# Patient Record
Sex: FEMALE | Race: WHITE | NOT HISPANIC OR LATINO | Employment: OTHER | URBAN - METROPOLITAN AREA
[De-identification: names, ages, dates, MRNs, and addresses within clinical notes are randomized per-mention and may not be internally consistent; named-entity substitution may affect disease eponyms.]

---

## 2023-09-19 PROBLEM — H61.22 CERUMEN DEBRIS ON TYMPANIC MEMBRANE OF LEFT EAR: Status: ACTIVE | Noted: 2023-09-19

## 2023-09-19 PROBLEM — M65.4 DE QUERVAIN'S SYNDROME (TENOSYNOVITIS): Status: ACTIVE | Noted: 2023-09-19

## 2023-09-19 PROBLEM — R00.2 PALPITATION: Status: ACTIVE | Noted: 2023-09-19

## 2023-09-19 PROBLEM — E78.5 HYPERLIPIDEMIA: Status: ACTIVE | Noted: 2023-09-19

## 2023-09-19 PROBLEM — M54.50 LUMBOSACRAL PAIN: Status: ACTIVE | Noted: 2023-09-19

## 2023-09-19 PROBLEM — R42 VERTIGO: Status: ACTIVE | Noted: 2023-09-19

## 2023-09-19 PROBLEM — I49.3 PVC (PREMATURE VENTRICULAR CONTRACTION): Status: ACTIVE | Noted: 2023-09-19

## 2023-09-19 PROBLEM — M85.80 OSTEOPENIA: Status: ACTIVE | Noted: 2023-09-19

## 2023-09-19 PROBLEM — I47.10 SVT (SUPRAVENTRICULAR TACHYCARDIA) (CMS-HCC): Status: ACTIVE | Noted: 2023-09-19

## 2023-09-19 RX ORDER — CHLORHEXIDINE GLUCONATE ORAL RINSE 1.2 MG/ML
SOLUTION DENTAL AS NEEDED
COMMUNITY
Start: 2020-10-03

## 2023-09-19 RX ORDER — VITS A,C,E/LUTEIN/MINERALS 300MCG-200
1 TABLET ORAL DAILY
COMMUNITY

## 2023-09-19 RX ORDER — ZINC GLUCONATE 50 MG
1 TABLET ORAL DAILY
COMMUNITY

## 2023-09-19 RX ORDER — DEXTROMETHORPHAN HYDROBROMIDE, GUAIFENESIN 5; 100 MG/5ML; MG/5ML
1 LIQUID ORAL EVERY 8 HOURS PRN
COMMUNITY

## 2023-09-19 RX ORDER — MULTIVIT-MIN/FA/LYCOPEN/LUTEIN .4-300-25
1 TABLET ORAL DAILY
COMMUNITY

## 2023-09-19 RX ORDER — ALUMINUM ZIRCONIUM OCTACHLOROHYDREX GLY 16 G/100G
1 GEL TOPICAL DAILY
COMMUNITY
End: 2024-04-15 | Stop reason: WASHOUT

## 2023-10-19 ENCOUNTER — APPOINTMENT (OUTPATIENT)
Dept: PRIMARY CARE | Facility: CLINIC | Age: 83
End: 2023-10-19
Payer: MEDICARE

## 2024-04-14 NOTE — PROGRESS NOTES
"Chief Complaint   Patient presents with    Medicare Annual Wellness Visit Subsequent     Pt here for AWV       83 y.o. for AWV  Last visit 11/2021. Was in [place] for a long time because of her daughter's illness  Daughter with ulcers, severe narrowing of esophagus, and had to have part of stomach removed.  Pt now back in [place] and planning to stay here for next few months.     Spinal stenosis-had numbness feet up leg.   Taking NeuroPure for nerve health. and Durable heart (contains bergamonte)  for cholesterol. She wanted to avoid statins.     Past medical history  Palpitations on chronic atenolol. Dr Cardoza  admission 2019. Dr Medina.   Glaucoma status post surgery  Cataract status post surgery Dr. Liban Harrington  Left fibular fracture, remote  Osteopenia  Hyperlipidemia previously declined statin  Sciatica, slipped disc. Physical therapy. Dr Puente     Social History   2 children. son and daughter alive and well  nonsmoker  retired. special needs education.    Blood pressure 118/68, pulse 68, temperature 36.6 °C (97.9 °F), height 1.613 m (5' 3.5\"), weight 48.5 kg (107 lb), SpO2 98%.  Body mass index is 18.66 kg/m².    Physical Examination  General: NAD. Alert.   HEENT: Normocephalic atraumatic.    Eyes: no scleral icterus. Extraocular movements intact.  Pupils equal round and reactive to light.  Ears: TM intact.  No cerumen. Hearing grossly intact.   Throat: No exudate  Neck:  Supple. No thyroid goiter.  Lymph nodes: No cervical or clavicular adenopathy  Cardiovascular: Regular rate rhythm S1-S2 normal no murmur. No carotid bruit.   Lungs: Clear to Auscultation without wheezing, rales, or rhonchi, Breath sounds symmetric. No use of accessory muscles  Abdomen:  +abdominal bruit. Normoactive bowel sounds, soft, non-tender. No mass.  No organomegaly  Extremities: No pretibial edema  Neuro: no facial asymmetry. Strength upper and lower extremities 5/5. Sensation intact to light touch. No tremor. " Babinski negative  Skin: no rash noted  Vascular: DP pulses intact.   Breast: Both are symmetrical no nipple discharge.  No skin changes.  No mass palpated.  No axillary adenopathy.    No recent labs    ASSESSMENT/PLAN  AWV  Living Will: has a living will.   Cognition/Memory if 65 or above: 3/3 recall   Hepatitis C (18-79) n/a  HIV (18-64, once) n/a  Women: mammogram: declines  Dexa: ordered  Colon cancer screening 45 and older: did not recommend  Immunization:   Depression:  (if screen score 0 do not bill)   Time Spent doing/reviewing depression screening:     If Smoker/Former smoker: screen US aorta (man 65-75) n/a  Age 50-75, 20 pack year history, quit within 15 years or currently smoking  (medicare 55-77, 30 pack year) n/a    1. Asymptomatic menopausal state  - XR DEXA bone density; Future    2. Routine general medical examination at health care facility    3. Spinal stenosis, unspecified spinal region      4. Numbness and tingling  - Comprehensive metabolic panel; Future  - CBC; Future  - Vitamin B12; Future    5. Screening, lipid  - Lipid Panel; Future    6. Vitamin D deficiency  - Vitamin D 25-Hydroxy,Total (for eval of Vitamin D levels); Future    7. Abdominal bruit  No GI symptoms  - Vascular US renal artery duplex complete; Future  - Vascular US Abdominal Aorta Aneurysm AAA Screening; Future    8. SVT (supraventricular tachycardia) (CMS-HCC)  No cardiopulmonary symptoms at this time.        Current Outpatient Medications on File Prior to Visit   Medication Sig Dispense Refill    acetaminophen (Tylenol 8 HOUR) 650 mg ER tablet Take 1 tablet (650 mg) by mouth every 8 hours if needed. 3-4 times daily      calcium-vitamin D3-vitamin K 500-100-40 mg-unit-mcg tablet,chewable Chew 1 tablet once daily.      chlorhexidine (Peridex) 0.12 % solution Use in the mouth or throat if needed.      diclofenac sodium 1 % kit 2 times a day as needed.  APPLY SPARINGLY TO AFFECTED AREA(S) TWICE DAILY as needed      magnesium  oxide 500 mg capsule Take 1 capsule (500 mg) by mouth once daily.      multivitamin with minerals iron-free (Centrum Silver) Take 1 tablet by mouth once daily.      vit A,C and E-lutein-minerals (Ocuvite with Lutein) 300 mcg-200 mg-27 mg-2 mg tablet Take 1 tablet by mouth once daily.      Bifidobacterium infantis (Align) 4 mg capsule Take 1 capsule (4 mg) by mouth once daily.       No current facility-administered medications on file prior to visit.

## 2024-04-15 ENCOUNTER — OFFICE VISIT (OUTPATIENT)
Dept: PRIMARY CARE | Facility: CLINIC | Age: 84
End: 2024-04-15
Payer: MEDICARE

## 2024-04-15 VITALS
SYSTOLIC BLOOD PRESSURE: 118 MMHG | DIASTOLIC BLOOD PRESSURE: 68 MMHG | OXYGEN SATURATION: 98 % | BODY MASS INDEX: 18.27 KG/M2 | HEIGHT: 64 IN | TEMPERATURE: 97.9 F | HEART RATE: 68 BPM | WEIGHT: 107 LBS

## 2024-04-15 DIAGNOSIS — Z78.0 ASYMPTOMATIC MENOPAUSAL STATE: ICD-10-CM

## 2024-04-15 DIAGNOSIS — M48.00 SPINAL STENOSIS, UNSPECIFIED SPINAL REGION: ICD-10-CM

## 2024-04-15 DIAGNOSIS — Z13.220 SCREENING, LIPID: ICD-10-CM

## 2024-04-15 DIAGNOSIS — R09.89 ABDOMINAL BRUIT: ICD-10-CM

## 2024-04-15 DIAGNOSIS — R20.0 NUMBNESS AND TINGLING: ICD-10-CM

## 2024-04-15 DIAGNOSIS — I47.10 SVT (SUPRAVENTRICULAR TACHYCARDIA) (CMS-HCC): ICD-10-CM

## 2024-04-15 DIAGNOSIS — R20.2 NUMBNESS AND TINGLING: ICD-10-CM

## 2024-04-15 DIAGNOSIS — Z00.00 ROUTINE GENERAL MEDICAL EXAMINATION AT HEALTH CARE FACILITY: Primary | ICD-10-CM

## 2024-04-15 DIAGNOSIS — E55.9 VITAMIN D DEFICIENCY: ICD-10-CM

## 2024-04-15 PROBLEM — R42 VERTIGO: Status: RESOLVED | Noted: 2023-09-19 | Resolved: 2024-04-15

## 2024-04-15 PROBLEM — H61.22 CERUMEN DEBRIS ON TYMPANIC MEMBRANE OF LEFT EAR: Status: RESOLVED | Noted: 2023-09-19 | Resolved: 2024-04-15

## 2024-04-15 PROBLEM — H40.9 GLAUCOMA: Status: ACTIVE | Noted: 2024-04-15

## 2024-04-15 PROBLEM — M54.30 SCIATICA: Status: ACTIVE | Noted: 2024-04-15

## 2024-04-15 PROBLEM — H26.9 CATARACT: Status: ACTIVE | Noted: 2024-04-15

## 2024-04-15 PROCEDURE — 1126F AMNT PAIN NOTED NONE PRSNT: CPT | Performed by: INTERNAL MEDICINE

## 2024-04-15 PROCEDURE — 1170F FXNL STATUS ASSESSED: CPT | Performed by: INTERNAL MEDICINE

## 2024-04-15 PROCEDURE — 1036F TOBACCO NON-USER: CPT | Performed by: INTERNAL MEDICINE

## 2024-04-15 PROCEDURE — G0439 PPPS, SUBSEQ VISIT: HCPCS | Performed by: INTERNAL MEDICINE

## 2024-04-15 PROCEDURE — 99397 PER PM REEVAL EST PAT 65+ YR: CPT | Performed by: INTERNAL MEDICINE

## 2024-04-15 PROCEDURE — 1159F MED LIST DOCD IN RCRD: CPT | Performed by: INTERNAL MEDICINE

## 2024-04-15 ASSESSMENT — ACTIVITIES OF DAILY LIVING (ADL)
DOING_HOUSEWORK: INDEPENDENT
GROCERY_SHOPPING: INDEPENDENT
DRESSING: INDEPENDENT
MANAGING_FINANCES: INDEPENDENT
BATHING: INDEPENDENT
TAKING_MEDICATION: INDEPENDENT

## 2024-04-15 ASSESSMENT — LIFESTYLE VARIABLES
SKIP TO QUESTIONS 9-10: 1
HOW OFTEN DO YOU HAVE SIX OR MORE DRINKS ON ONE OCCASION: NEVER
HOW MANY STANDARD DRINKS CONTAINING ALCOHOL DO YOU HAVE ON A TYPICAL DAY: PATIENT DOES NOT DRINK
HOW OFTEN DO YOU HAVE A DRINK CONTAINING ALCOHOL: NEVER
AUDIT-C TOTAL SCORE: 0

## 2024-04-15 ASSESSMENT — PATIENT HEALTH QUESTIONNAIRE - PHQ9
1. LITTLE INTEREST OR PLEASURE IN DOING THINGS: NOT AT ALL
2. FEELING DOWN, DEPRESSED OR HOPELESS: NOT AT ALL
SUM OF ALL RESPONSES TO PHQ9 QUESTIONS 1 & 2: 0

## 2024-04-15 ASSESSMENT — PAIN SCALES - GENERAL: PAINLEVEL: 0-NO PAIN

## 2024-04-22 ENCOUNTER — LAB (OUTPATIENT)
Dept: LAB | Facility: LAB | Age: 84
End: 2024-04-22
Payer: MEDICARE

## 2024-04-22 DIAGNOSIS — E55.9 VITAMIN D DEFICIENCY: ICD-10-CM

## 2024-04-22 DIAGNOSIS — R20.2 NUMBNESS AND TINGLING: ICD-10-CM

## 2024-04-22 DIAGNOSIS — Z13.220 SCREENING, LIPID: ICD-10-CM

## 2024-04-22 DIAGNOSIS — R20.0 NUMBNESS AND TINGLING: ICD-10-CM

## 2024-04-22 LAB
25(OH)D3 SERPL-MCNC: 81 NG/ML (ref 30–100)
ALBUMIN SERPL BCP-MCNC: 4.2 G/DL (ref 3.4–5)
ALP SERPL-CCNC: 58 U/L (ref 33–136)
ALT SERPL W P-5'-P-CCNC: 20 U/L (ref 7–45)
ANION GAP SERPL CALC-SCNC: 12 MMOL/L (ref 10–20)
AST SERPL W P-5'-P-CCNC: 25 U/L (ref 9–39)
BILIRUB SERPL-MCNC: 0.5 MG/DL (ref 0–1.2)
BUN SERPL-MCNC: 14 MG/DL (ref 6–23)
CALCIUM SERPL-MCNC: 9.3 MG/DL (ref 8.6–10.6)
CHLORIDE SERPL-SCNC: 105 MMOL/L (ref 98–107)
CHOLEST SERPL-MCNC: 218 MG/DL (ref 0–199)
CHOLESTEROL/HDL RATIO: 2.4
CO2 SERPL-SCNC: 30 MMOL/L (ref 21–32)
CREAT SERPL-MCNC: 0.88 MG/DL (ref 0.5–1.05)
EGFRCR SERPLBLD CKD-EPI 2021: 65 ML/MIN/1.73M*2
ERYTHROCYTE [DISTWIDTH] IN BLOOD BY AUTOMATED COUNT: 12.9 % (ref 11.5–14.5)
GLUCOSE SERPL-MCNC: 79 MG/DL (ref 74–99)
HCT VFR BLD AUTO: 40.7 % (ref 36–46)
HDLC SERPL-MCNC: 90 MG/DL
HGB BLD-MCNC: 12.9 G/DL (ref 12–16)
LDLC SERPL CALC-MCNC: 110 MG/DL
MCH RBC QN AUTO: 31.1 PG (ref 26–34)
MCHC RBC AUTO-ENTMCNC: 31.7 G/DL (ref 32–36)
MCV RBC AUTO: 98 FL (ref 80–100)
NON HDL CHOLESTEROL: 128 MG/DL (ref 0–149)
NRBC BLD-RTO: 0 /100 WBCS (ref 0–0)
PLATELET # BLD AUTO: 222 X10*3/UL (ref 150–450)
POTASSIUM SERPL-SCNC: 3.9 MMOL/L (ref 3.5–5.3)
PROT SERPL-MCNC: 6.5 G/DL (ref 6.4–8.2)
RBC # BLD AUTO: 4.15 X10*6/UL (ref 4–5.2)
SODIUM SERPL-SCNC: 143 MMOL/L (ref 136–145)
TRIGL SERPL-MCNC: 88 MG/DL (ref 0–149)
VIT B12 SERPL-MCNC: 846 PG/ML (ref 211–911)
VLDL: 18 MG/DL (ref 0–40)
WBC # BLD AUTO: 6.4 X10*3/UL (ref 4.4–11.3)

## 2024-04-22 PROCEDURE — 82306 VITAMIN D 25 HYDROXY: CPT

## 2024-04-22 PROCEDURE — 36415 COLL VENOUS BLD VENIPUNCTURE: CPT

## 2024-04-22 PROCEDURE — 85027 COMPLETE CBC AUTOMATED: CPT

## 2024-04-22 PROCEDURE — 80061 LIPID PANEL: CPT

## 2024-04-22 PROCEDURE — 80053 COMPREHEN METABOLIC PANEL: CPT

## 2024-04-22 PROCEDURE — 82607 VITAMIN B-12: CPT

## 2024-04-29 ENCOUNTER — TELEPHONE (OUTPATIENT)
Dept: PRIMARY CARE | Facility: CLINIC | Age: 84
End: 2024-04-29
Payer: MEDICARE

## 2024-04-29 NOTE — TELEPHONE ENCOUNTER
----- Message from Ana Decker MD sent at 4/26/2024  4:34 PM EDT -----  Bone density test shows osteoporosis.  Please have patient schedule appointment to go over results and discuss treatment options.  Visit can be a virtual visit

## 2024-05-07 PROBLEM — E55.9 VITAMIN D DEFICIENCY: Status: ACTIVE | Noted: 2024-04-22

## 2024-05-07 PROBLEM — M65.4 RADIAL STYLOID TENOSYNOVITIS: Status: ACTIVE | Noted: 2023-09-19

## 2024-05-07 PROBLEM — H61.20 IMPACTED CERUMEN: Status: ACTIVE | Noted: 2024-05-07

## 2024-05-07 PROBLEM — T83.511A URINARY TRACT INFECTION ASSOCIATED WITH CATHETERIZATION OF URINARY TRACT (CMS-HCC): Status: ACTIVE | Noted: 2024-05-07

## 2024-05-07 PROBLEM — N34.2 INFECTIVE URETHRITIS: Status: ACTIVE | Noted: 2024-05-07

## 2024-05-07 PROBLEM — M54.16 LUMBAR RADICULITIS: Status: ACTIVE | Noted: 2023-09-19

## 2024-05-07 PROBLEM — I10 HYPERTENSION: Status: ACTIVE | Noted: 2024-05-07

## 2024-05-07 PROBLEM — R09.89 ABDOMINAL BRUIT: Status: ACTIVE | Noted: 2024-05-07

## 2024-05-07 PROBLEM — N39.0 URINARY TRACT INFECTION ASSOCIATED WITH CATHETERIZATION OF URINARY TRACT (CMS-HCC): Status: ACTIVE | Noted: 2024-05-07

## 2024-05-08 ENCOUNTER — OFFICE VISIT (OUTPATIENT)
Dept: PRIMARY CARE | Facility: CLINIC | Age: 84
End: 2024-05-08
Payer: MEDICARE

## 2024-05-08 VITALS
HEART RATE: 68 BPM | TEMPERATURE: 98.2 F | OXYGEN SATURATION: 93 % | WEIGHT: 107.8 LBS | BODY MASS INDEX: 18.4 KG/M2 | HEIGHT: 64 IN | DIASTOLIC BLOOD PRESSURE: 60 MMHG | SYSTOLIC BLOOD PRESSURE: 110 MMHG

## 2024-05-08 DIAGNOSIS — R19.7 DIARRHEA, UNSPECIFIED TYPE: ICD-10-CM

## 2024-05-08 DIAGNOSIS — M81.0 OSTEOPOROSIS, UNSPECIFIED OSTEOPOROSIS TYPE, UNSPECIFIED PATHOLOGICAL FRACTURE PRESENCE: Primary | ICD-10-CM

## 2024-05-08 PROBLEM — N39.0 URINARY TRACT INFECTION ASSOCIATED WITH CATHETERIZATION OF URINARY TRACT (CMS-HCC): Status: RESOLVED | Noted: 2024-05-07 | Resolved: 2024-05-08

## 2024-05-08 PROBLEM — N34.2 INFECTIVE URETHRITIS: Status: RESOLVED | Noted: 2024-05-07 | Resolved: 2024-05-08

## 2024-05-08 PROBLEM — T83.511A URINARY TRACT INFECTION ASSOCIATED WITH CATHETERIZATION OF URINARY TRACT (CMS-HCC): Status: RESOLVED | Noted: 2024-05-07 | Resolved: 2024-05-08

## 2024-05-08 PROCEDURE — 1160F RVW MEDS BY RX/DR IN RCRD: CPT | Performed by: INTERNAL MEDICINE

## 2024-05-08 PROCEDURE — 3078F DIAST BP <80 MM HG: CPT | Performed by: INTERNAL MEDICINE

## 2024-05-08 PROCEDURE — 3074F SYST BP LT 130 MM HG: CPT | Performed by: INTERNAL MEDICINE

## 2024-05-08 PROCEDURE — 1036F TOBACCO NON-USER: CPT | Performed by: INTERNAL MEDICINE

## 2024-05-08 PROCEDURE — 1159F MED LIST DOCD IN RCRD: CPT | Performed by: INTERNAL MEDICINE

## 2024-05-08 PROCEDURE — 99213 OFFICE O/P EST LOW 20 MIN: CPT | Performed by: INTERNAL MEDICINE

## 2024-05-08 PROCEDURE — 1126F AMNT PAIN NOTED NONE PRSNT: CPT | Performed by: INTERNAL MEDICINE

## 2024-05-08 ASSESSMENT — PATIENT HEALTH QUESTIONNAIRE - PHQ9
1. LITTLE INTEREST OR PLEASURE IN DOING THINGS: NOT AT ALL
SUM OF ALL RESPONSES TO PHQ9 QUESTIONS 1 & 2: 0
2. FEELING DOWN, DEPRESSED OR HOPELESS: NOT AT ALL

## 2024-05-08 ASSESSMENT — PAIN SCALES - GENERAL: PAINLEVEL: 0-NO PAIN

## 2024-05-08 NOTE — PROGRESS NOTES
"Chief Complaint   Patient presents with    Results     Pt here to review results and discuss treatment for Osteoporosis.       83 y.o.   Last visit  4/22/2024. Visit to discuss dexa results.   Intermittent diarrhea.   First severe episode 1 month ago.  Now intermittent. Not as severe.  She will have watery stool when it happens  Has history of lactose intolerance.  Wondering if could be from magnesium  Taking probiotic tea instead of coffee. Was drinking a pot of coffee a day.      Past medical history  Palpitations on chronic atenolol. Dr Cardoza  admission 2019. Dr Medina.   Glaucoma status post surgery  Cataract status post surgery Dr. Liban Harrington  Left fibular fracture, remote  Osteopenia  Hyperlipidemia previously declined statin  Sciatica, slipped disc. Physical therapy. Dr Puente     Social History   2 children. son and daughter alive and well  nonsmoker  retired. special needs education.  Daughter very ill.  (ulcers, severe narrowing of esophagus, and had to have part of stomach removed.  Daughter lives in [place].      Blood pressure 110/60, pulse 68, temperature 36.8 °C (98.2 °F), height 1.613 m (5' 3.5\"), weight 48.9 kg (107 lb 12.8 oz), SpO2 93%.  Body mass index is 18.8 kg/m².    4/2024 b12, D, cbc, cmp, lipid  Lab Results   Component Value Date    WBC 6.4 04/22/2024    HGB 12.9 04/22/2024    HCT 40.7 04/22/2024    MCV 98 04/22/2024     04/22/2024     Lab Results   Component Value Date    GLUCOSE 79 04/22/2024    CALCIUM 9.3 04/22/2024     04/22/2024    K 3.9 04/22/2024    CO2 30 04/22/2024     04/22/2024    BUN 14 04/22/2024    CREATININE 0.88 04/22/2024     Lab Results   Component Value Date    ALT 20 04/22/2024    AST 25 04/22/2024    ALKPHOS 58 04/22/2024    BILITOT 0.5 04/22/2024     Lab Results   Component Value Date    CHOL 218 (H) 04/22/2024     Lab Results   Component Value Date    HDL 90.0 04/22/2024     Lab Results   Component Value Date    LDLCALC 110 (H) " "04/22/2024     Lab Results   Component Value Date    TRIG 88 04/22/2024     No components found for: \"CHOLHDL\"    ASSESSMENT/PLAN  1. Osteoporosis, unspecified osteoporosis type, unspecified pathological fracture presence  Discussed bone density test results.  We also discussed that sometimes it can overestimate the strength of the bones because of arthritis.  We discussed medication options such as Fosamax and Prolia.  We also discussed that there are other options available through specialist.  She indicates she does have some gum problems.  She is not interested in taking any medication for osteoporosis as she does not like to take much in terms of medication.  She is taking vitamin D and has a normal vitamin D level.  She does exercise consistently and will continue to do this.  She is planning to look at a program at [facility].  We also talked about ostial strong program she could look into that.    2. Diarrhea, unspecified type  Unclear etiology.  Advised when she has diarrhea again to obtain stool studies.  Will also check celiac.  We discussed that a negative celiac blood test does not completely rule out celiac disease.  If stool studies are negative and diarrhea persists then recommend see gastroenterologist.  - C. difficile, PCR; Future  - Celiac Panel; Future  - Stool Pathogen Panel, PCR; Future    Living Will: has a living will.   Women: mammogram: declines  Dexa:  4/2024 T -2.9   Colon cancer screening 45 and older: did not recommend    Current Outpatient Medications on File Prior to Visit   Medication Sig Dispense Refill    acetaminophen (Tylenol 8 HOUR) 650 mg ER tablet Take 1 tablet (650 mg) by mouth every 8 hours if needed. 3-4 times daily      calcium-vitamin D3-vitamin K 500-100-40 mg-unit-mcg tablet,chewable Chew 1 tablet once daily.      chlorhexidine (Peridex) 0.12 % solution Use in the mouth or throat if needed.      diclofenac sodium 1 % kit 2 times a day as needed.  APPLY SPARINGLY TO " AFFECTED AREA(S) TWICE DAILY as needed      magnesium oxide 500 mg capsule Take 1 capsule (500 mg) by mouth once daily.      multivitamin with minerals iron-free (Centrum Silver) Take 1 tablet by mouth once daily.      vit A,C and E-lutein-minerals (Ocuvite with Lutein) 300 mcg-200 mg-27 mg-2 mg tablet Take 1 tablet by mouth once daily.       No current facility-administered medications on file prior to visit.

## 2024-10-03 ENCOUNTER — OFFICE VISIT (OUTPATIENT)
Dept: PRIMARY CARE | Facility: CLINIC | Age: 84
End: 2024-10-03
Payer: MEDICARE

## 2024-10-03 VITALS
OXYGEN SATURATION: 92 % | BODY MASS INDEX: 20.44 KG/M2 | HEIGHT: 63 IN | WEIGHT: 115.36 LBS | HEART RATE: 71 BPM | SYSTOLIC BLOOD PRESSURE: 122 MMHG | DIASTOLIC BLOOD PRESSURE: 72 MMHG

## 2024-10-03 DIAGNOSIS — H91.93 DECREASED HEARING OF BOTH EARS: Primary | ICD-10-CM

## 2024-10-03 DIAGNOSIS — E78.00 PURE HYPERCHOLESTEROLEMIA: ICD-10-CM

## 2024-10-03 DIAGNOSIS — H61.23 BILATERAL IMPACTED CERUMEN: ICD-10-CM

## 2024-10-03 PROCEDURE — 1036F TOBACCO NON-USER: CPT | Performed by: INTERNAL MEDICINE

## 2024-10-03 PROCEDURE — 3074F SYST BP LT 130 MM HG: CPT | Performed by: INTERNAL MEDICINE

## 2024-10-03 PROCEDURE — 3078F DIAST BP <80 MM HG: CPT | Performed by: INTERNAL MEDICINE

## 2024-10-03 PROCEDURE — 1159F MED LIST DOCD IN RCRD: CPT | Performed by: INTERNAL MEDICINE

## 2024-10-03 PROCEDURE — 99213 OFFICE O/P EST LOW 20 MIN: CPT | Performed by: INTERNAL MEDICINE

## 2024-10-03 PROCEDURE — 69210 REMOVE IMPACTED EAR WAX UNI: CPT | Performed by: INTERNAL MEDICINE

## 2024-10-03 ASSESSMENT — ENCOUNTER SYMPTOMS
OCCASIONAL FEELINGS OF UNSTEADINESS: 0
DEPRESSION: 0
LOSS OF SENSATION IN FEET: 0

## 2024-10-03 ASSESSMENT — PATIENT HEALTH QUESTIONNAIRE - PHQ9
SUM OF ALL RESPONSES TO PHQ9 QUESTIONS 1 & 2: 0
1. LITTLE INTEREST OR PLEASURE IN DOING THINGS: NOT AT ALL
2. FEELING DOWN, DEPRESSED OR HOPELESS: NOT AT ALL

## 2024-10-03 NOTE — PROGRESS NOTES
Internal Medicine Outpatient Visit  Chief Complaint   Patient presents with    ear blockage     Both ears possible wax and fullness for months.        HPI: Carolyne Weeks is of 84 y.o. who is here for Internal Visit for the following issues:  Patient is seen in office today with chief complaint of hearing impairment in both ears for several weeks.  She denies any headache or visual disturbances.  She has no sore throat cough or any respiratory symptoms.  Denies any chest pain or palpitation.  Has no weakness of any extremity.  Review of other systems is negative.    Past Surgical History:   Procedure Laterality Date    OTHER SURGICAL HISTORY  11/02/2021    Cataract surgery    OTHER SURGICAL HISTORY  11/02/2021    Eye surgery    OTHER SURGICAL HISTORY  11/02/2021    Complete colonoscopy    OTHER SURGICAL HISTORY  11/02/2021    Appendectomy    OTHER SURGICAL HISTORY  11/02/2021    Hysterectomy    OTHER SURGICAL HISTORY  11/18/2021    Surgery       Family History   Problem Relation Name Age of Onset    Dementia Mother      Other (CVA) Father      Other (cardiac disorder) Brother           Current Outpatient Medications on File Prior to Visit   Medication Sig Dispense Refill    acetaminophen (Tylenol 8 HOUR) 650 mg ER tablet Take 1 tablet (650 mg) by mouth every 8 hours if needed. 3-4 times daily      calcium-vitamin D3-vitamin K 500-100-40 mg-unit-mcg tablet,chewable Chew 1 tablet once daily.      chlorhexidine (Peridex) 0.12 % solution Use in the mouth or throat if needed.      diclofenac sodium 1 % kit 2 times a day as needed.  APPLY SPARINGLY TO AFFECTED AREA(S) TWICE DAILY as needed      magnesium oxide 500 mg capsule Take 1 capsule (500 mg) by mouth once daily.      multivitamin with minerals iron-free (Centrum Silver) Take 1 tablet by mouth once daily.      vit A,C and E-lutein-minerals (Ocuvite with Lutein) 300 mcg-200 mg-27 mg-2 mg tablet Take 1 tablet by mouth once daily.       No current facility-administered  "medications on file prior to visit.       Blood pressure 122/72, pulse 71, height 1.6 m (5' 3\"), weight 52.3 kg (115 lb 5.8 oz), SpO2 92%.  Body mass index is 20.44 kg/m².  General examination: Normal  Eyes: There is no pallor or jaundice there is no nystagmus  Ears: Bilateral cerumen impaction is noted more marked in the left side.  Tympanic membrane's are partially visualized  Neck: There is no JVD or thyroid enlargement  Lungs: Clear to auscultation  CVS: Heart sounds are regular there is no gallop murmur    Assessment and plan:    1. Decreased hearing of both ears  There is partial relief in hearing impairment after the procedure.  Patient is advised in case of persistent hearing impairment she should follow-up with her regular primary care physician in a couple of weeks.    2. Bilateral impacted cerumen  Wax is removed from both ears with a scoop and subsequently washed out patient has some feeling of nausea during the procedure.  But she has relief in hearing impairment.  Some erythema of external auditory canal is present but there is no active bleeding.  Patient is advised if she has bleeding or any other new symptoms she should call our office immediately.    3. Pure hypercholesterolemia  Patient is watching her diet.  She is not taking any cholesterol medication at this time.                   "

## 2025-01-02 ENCOUNTER — LAB (OUTPATIENT)
Dept: LAB | Facility: LAB | Age: 85
End: 2025-01-02
Payer: MEDICARE

## 2025-01-02 ENCOUNTER — TELEPHONE (OUTPATIENT)
Dept: PRIMARY CARE | Facility: CLINIC | Age: 85
End: 2025-01-02

## 2025-01-02 DIAGNOSIS — R19.7 DIARRHEA, UNSPECIFIED TYPE: Primary | ICD-10-CM

## 2025-01-02 DIAGNOSIS — R19.7 DIARRHEA, UNSPECIFIED TYPE: ICD-10-CM

## 2025-01-02 LAB
GLIADIN PEPTIDE IGA SER IA-ACNC: <1 U/ML
TTG IGA SER IA-ACNC: <1 U/ML

## 2025-01-02 PROCEDURE — 83516 IMMUNOASSAY NONANTIBODY: CPT

## 2025-01-02 NOTE — TELEPHONE ENCOUNTER
"Pt called \"I went to the lab today because I am now back in town and had some blood work done.  I brought a stool specimen with me because Dr. Thrasher mentioned checking the stool at my last appointment,  They did not have any orders for checking the stool.  They did give me a new kit to collect a sample if Dr. Thrasher wants to order the tests.\"  Pt c/o intermittent diarrhea with occasional abdominal discomfort since May   (Pt requests response in My chart)  Last OV 5/8/24  "

## 2025-01-04 LAB
GLIADIN PEPTIDE IGG SER IA-ACNC: <0.56 FLU (ref 0–4.99)
TTG IGG SER IA-ACNC: <0.82 FLU (ref 0–4.99)

## 2025-01-06 ENCOUNTER — LAB (OUTPATIENT)
Dept: LAB | Facility: LAB | Age: 85
End: 2025-01-06
Payer: MEDICARE

## 2025-01-06 DIAGNOSIS — R19.7 DIARRHEA, UNSPECIFIED TYPE: ICD-10-CM

## 2025-01-09 ENCOUNTER — LAB (OUTPATIENT)
Dept: LAB | Facility: LAB | Age: 85
End: 2025-01-09
Payer: MEDICARE

## 2025-01-09 DIAGNOSIS — R19.7 DIARRHEA, UNSPECIFIED TYPE: ICD-10-CM

## 2025-01-09 PROCEDURE — 87328 CRYPTOSPORIDIUM AG IA: CPT

## 2025-01-09 PROCEDURE — 87506 IADNA-DNA/RNA PROBE TQ 6-11: CPT

## 2025-01-09 PROCEDURE — 87329 GIARDIA AG IA: CPT

## 2025-01-11 LAB

## 2025-01-12 LAB
CRYPTOSP AG STL QL IA: NEGATIVE
G LAMBLIA AG STL QL IA: NEGATIVE

## 2025-01-14 DIAGNOSIS — R19.7 DIARRHEA, UNSPECIFIED TYPE: Primary | ICD-10-CM

## 2025-01-15 LAB — O+P STL MICRO: NEGATIVE

## 2025-01-21 LAB
NON-UH HIE A/G RATIO: 1.1
NON-UH HIE ALB: 3.6 G/DL (ref 3.4–5)
NON-UH HIE ALK PHOS: 70 UNIT/L (ref 45–117)
NON-UH HIE BASO COUNT: 0.02 X1000 (ref 0–0.2)
NON-UH HIE BASOS %: 0.3 %
NON-UH HIE BILIRUBIN, TOTAL: 0.5 MG/DL (ref 0.3–1.2)
NON-UH HIE BUN/CREAT RATIO: 25
NON-UH HIE BUN: 20 MG/DL (ref 9–23)
NON-UH HIE C-REACTIVE PROTEIN, QUANTITATIVE: 1.1 MG/DL (ref 0–0.5)
NON-UH HIE CALCIUM: 9.2 MG/DL (ref 8.7–10.4)
NON-UH HIE CALCULATED OSMOLALITY: 283 MOSM/KG (ref 275–295)
NON-UH HIE CHLORIDE: 107 MMOL/L (ref 98–107)
NON-UH HIE CO2, VENOUS: 27 MMOL/L (ref 20–31)
NON-UH HIE CREATININE: 0.8 MG/DL (ref 0.5–0.8)
NON-UH HIE DIFF?: NO
NON-UH HIE EOS COUNT: 0.03 X1000 (ref 0–0.5)
NON-UH HIE EOSIN %: 0.4 %
NON-UH HIE FERRITIN: 186 NG/ML (ref 10–291)
NON-UH HIE FOLATE: 37.2 NG/ML (ref 5.4–17.5)
NON-UH HIE FREE T4: 1.15 NG/DL (ref 0.89–1.76)
NON-UH HIE GFR AA: >60
NON-UH HIE GLOBULIN: 3.2 G/DL
NON-UH HIE GLOMERULAR FILTRATION RATE: >60 ML/MIN/1.73M?
NON-UH HIE GLUCOSE: 86 MG/DL (ref 74–106)
NON-UH HIE GOT: 27 UNIT/L (ref 15–37)
NON-UH HIE GPT: 17 UNIT/L (ref 10–49)
NON-UH HIE HCT: 35.8 % (ref 36–46)
NON-UH HIE HGB: 12.2 G/DL (ref 12–16)
NON-UH HIE INSTR WBC: 7.3
NON-UH HIE IRON: 47 UG/DL (ref 50–170)
NON-UH HIE K: 3.6 MMOL/L (ref 3.5–5.1)
NON-UH HIE LYMPH %: 31.4 %
NON-UH HIE LYMPH COUNT: 2.3 X1000 (ref 1.2–4.8)
NON-UH HIE MAGNESIUM: 2 MG/DL (ref 1.6–2.6)
NON-UH HIE MCH: 32.4 PG (ref 27–34)
NON-UH HIE MCHC: 34 G/DL (ref 32–37)
NON-UH HIE MCV: 95.1 FL (ref 80–100)
NON-UH HIE MONO %: 7.8 %
NON-UH HIE MONO COUNT: 0.57 X1000 (ref 0.1–1)
NON-UH HIE MPV: 7 FL (ref 7.4–10.4)
NON-UH HIE NA: 141 MMOL/L (ref 135–145)
NON-UH HIE NEUTROPHIL %: 60 %
NON-UH HIE NEUTROPHIL COUNT (ANC): 4.39 X1000 (ref 1.4–8.8)
NON-UH HIE NUCLEATED RBC: 0 /100WBC
NON-UH HIE PLATELET: 267 X10 (ref 150–450)
NON-UH HIE RBC: 3.77 X10 (ref 4.2–5.4)
NON-UH HIE RDW: 13.3 % (ref 11.5–14.5)
NON-UH HIE SATURATION: 17.1 % (ref 20–50)
NON-UH HIE SED RATE WESTERGREN: 32 MM/HR (ref 0–30)
NON-UH HIE TIBC: 275 UG/ML (ref 250–425)
NON-UH HIE TOTAL PROTEIN: 6.8 G/DL (ref 5.7–8.2)
NON-UH HIE TSH: 2.68 UIU/ML (ref 0.55–4.78)
NON-UH HIE VIT D 25: 53 NG/ML
NON-UH HIE VITAMIN B12: 981 PG/ML (ref 211–911)
NON-UH HIE WBC: 7.3 X10 (ref 4.5–11)

## 2025-01-22 LAB
NON-UH HIE ANTI-DNA: NEGATIVE
NON-UH HIE ANTI-NUCLEAR ANTIBODY: NEGATIVE

## 2025-01-24 LAB — NON-UH HIE C. DIFFICILE TOXIN: NEGATIVE

## 2025-01-28 LAB
NON-UH HIE CALPROTECTIN, FECAL: 82
NON-UH HIE PANCREATIC ELASTASE: 362

## 2025-05-31 NOTE — PROGRESS NOTES
"Chief Complaint   Patient presents with    Medicare Annual Wellness Visit Subsequent       84 y.o. for AWV and several concerns.   Last visit 05/2024  Budesonide per GI Dr Webb about 2.5 months. Plan 4 months and then taper.   Weight gain, headache, nausea, dizziness, chest heaviness started few weeks ago  Intermittent chest heaviness throughout the day. No inciting event. Not exertional. No vomiting.  Does drink beverages with caffeine.    Leg cramps-chronic, increased at night. Taking mg. No pain when walking.     Past medical history  Palpitations on chronic atenolol. Dr Cardoza  admission 2019. Dr Medina.   Glaucoma status post surgery  Cataract status post surgery Dr. Liban Harrington  Left fibular fracture, remote  Osteopenia  Hyperlipidemia previously declined statin  Sciatica, slipped disc. Physical therapy. Dr Puente     Social History   2 children. son and daughter alive and well  nonsmoker  retired. special needs education.  Daughter very ill.  (ulcers, severe narrowing of esophagus, and had to have part of stomach removed.  Daughter lives in [place].    Blood pressure 108/70, pulse 72, height 1.613 m (5' 3.5\"), weight 54 kg (119 lb).  Body mass index is 20.75 kg/m².    Physical Examination  General: NAD. Alert.   HEENT: Normocephalic atraumatic.    Eyes: no scleral icterus. Extraocular movements intact.  Pupils equal round and reactive to light.  Ears: TM intact.  No cerumen. Hearing grossly intact.   Throat: No exudate  Neck:  Supple. No thyroid goiter.  Lymph nodes: No cervical or clavicular adenopathy  Cardiovascular: Regular rate rhythm S1-S2 normal no murmur. No carotid bruit.   Lungs: Clear to Auscultation without wheezing, rales, or rhonchi, Breath sounds symmetric. No use of accessory muscles  Abdomen:  Normoactive bowel sounds, soft, +tenderness but non-focal.  No mass.  No organomegaly  Extremities: No pretibial edema  Neuro: no facial asymmetry.  Skin: no rash noted  Vascular: " "PT pulses intact.     Labs 01/2025 calprotectin, fecal; pancreatic elastase fecal, c. Diff. Lisandra, esr, b12, cbc, D, thyroid, ferritin, CRP, cmp, Mg, iron, celiac      No results found for: \"HGBA1C\"  Lab Results   Component Value Date    GLUCOSE 79 04/22/2024    CALCIUM 9.3 04/22/2024     04/22/2024    K 3.9 04/22/2024    CO2 30 04/22/2024     04/22/2024    BUN 14 04/22/2024    CREATININE 0.88 04/22/2024     Lab Results   Component Value Date    ALT 20 04/22/2024    AST 25 04/22/2024    ALKPHOS 58 04/22/2024    BILITOT 0.5 04/22/2024     Lab Results   Component Value Date    WBC 6.4 04/22/2024    HGB 12.9 04/22/2024    HCT 40.7 04/22/2024    MCV 98 04/22/2024     04/22/2024     Lab Results   Component Value Date    CHOL 218 (H) 04/22/2024     Lab Results   Component Value Date    HDL 90.0 04/22/2024     Lab Results   Component Value Date    LDLCALC 110 (H) 04/22/2024     Lab Results   Component Value Date    TRIG 88 04/22/2024     No components found for: \"CHOLHDL\"      ASSESSMENT/PLAN  AWV  Living Will: has a living will   Cognition/Memory if 65 or above intact  Hepatitis C screen (18-79) n/a  Mammogram: declines  Dexa: up to date 04/2024 T score -2.9  HIV screen(18-64, once)  n/a  Colon cancer screening 45 and older: did not recommend   Immunization: recommend tdap. She thinks it was done at [facility]-she will check   Depression screen: denies  CT calcium score:  did not recommend     If Smoker/Former smoker:    Age 50-75, 20 pack year history, quit within 15 years or currently smoking  (medicare 55-77, 30 pack year)  n/a    1. Chest heaviness (Primary)  Atypical. Had cardiac work up but she reports years ago.   - ECG 12 Lead  - CBC; Future  - Comprehensive Metabolic Panel; Future  - CBC  - Comprehensive Metabolic Panel  - Stress Test; Future    2. Leg cramps  Already taking mg. Recent vitamin d within goal.   Check iron.     3. Low iron  - CBC; Future  - Iron and TIBC; Future  - Ferritin; Future  - " CBC  - Iron and TIBC  - Ferritin    4. Hyperlipidemia, unspecified hyperlipidemia type  - Comprehensive Metabolic Panel; Future  - Lipid Panel; Future  - Comprehensive Metabolic Panel  - Lipid Panel    5. Routine general medical examination at health care facility  - 1 Year Follow Up In Primary Care - Wellness Exam; Future    Other does not feel that she need anything for nausea at this time        Medications Ordered Prior to Encounter[1]         [1]   Current Outpatient Medications on File Prior to Visit   Medication Sig Dispense Refill    acetaminophen (Tylenol 8 HOUR) 650 mg ER tablet Take 1 tablet (650 mg) by mouth every 8 hours if needed. 3-4 times daily      budesonide EC (Entocort EC) 3 mg 24 hr capsule TAKE 3 CAPSULES BY MOUTH ONCE A DAY FOR 90 DAYS      calcium-vitamin D3-vitamin K 500-100-40 mg-unit-mcg tablet,chewable Chew 1 tablet once daily.      chlorhexidine (Peridex) 0.12 % solution Use in the mouth or throat if needed.      magnesium oxide 500 mg capsule Take 1 capsule (500 mg) by mouth once daily.      multivitamin with minerals iron-free (Centrum Silver) Take 1 tablet by mouth once daily.      diclofenac sodium 1 % kit 2 times a day as needed.  APPLY SPARINGLY TO AFFECTED AREA(S) TWICE DAILY as needed (Patient not taking: Reported on 6/2/2025)       No current facility-administered medications on file prior to visit.

## 2025-06-02 ENCOUNTER — APPOINTMENT (OUTPATIENT)
Dept: PRIMARY CARE | Facility: CLINIC | Age: 85
End: 2025-06-02
Payer: MEDICARE

## 2025-06-02 VITALS
DIASTOLIC BLOOD PRESSURE: 70 MMHG | BODY MASS INDEX: 20.32 KG/M2 | HEIGHT: 64 IN | SYSTOLIC BLOOD PRESSURE: 108 MMHG | WEIGHT: 119 LBS | HEART RATE: 72 BPM

## 2025-06-02 DIAGNOSIS — R07.89 CHEST HEAVINESS: ICD-10-CM

## 2025-06-02 DIAGNOSIS — E61.1 LOW IRON: ICD-10-CM

## 2025-06-02 DIAGNOSIS — Z00.00 ROUTINE GENERAL MEDICAL EXAMINATION AT HEALTH CARE FACILITY: Primary | ICD-10-CM

## 2025-06-02 DIAGNOSIS — R25.2 LEG CRAMPS: ICD-10-CM

## 2025-06-02 DIAGNOSIS — E78.5 HYPERLIPIDEMIA, UNSPECIFIED HYPERLIPIDEMIA TYPE: ICD-10-CM

## 2025-06-02 PROCEDURE — G0439 PPPS, SUBSEQ VISIT: HCPCS | Performed by: INTERNAL MEDICINE

## 2025-06-02 PROCEDURE — 3078F DIAST BP <80 MM HG: CPT | Performed by: INTERNAL MEDICINE

## 2025-06-02 PROCEDURE — 1158F ADVNC CARE PLAN TLK DOCD: CPT | Performed by: INTERNAL MEDICINE

## 2025-06-02 PROCEDURE — 93000 ELECTROCARDIOGRAM COMPLETE: CPT | Performed by: INTERNAL MEDICINE

## 2025-06-02 PROCEDURE — 1170F FXNL STATUS ASSESSED: CPT | Performed by: INTERNAL MEDICINE

## 2025-06-02 PROCEDURE — 1036F TOBACCO NON-USER: CPT | Performed by: INTERNAL MEDICINE

## 2025-06-02 PROCEDURE — 1123F ACP DISCUSS/DSCN MKR DOCD: CPT | Performed by: INTERNAL MEDICINE

## 2025-06-02 PROCEDURE — 1160F RVW MEDS BY RX/DR IN RCRD: CPT | Performed by: INTERNAL MEDICINE

## 2025-06-02 PROCEDURE — 1159F MED LIST DOCD IN RCRD: CPT | Performed by: INTERNAL MEDICINE

## 2025-06-02 PROCEDURE — 99214 OFFICE O/P EST MOD 30 MIN: CPT | Performed by: INTERNAL MEDICINE

## 2025-06-02 PROCEDURE — 3074F SYST BP LT 130 MM HG: CPT | Performed by: INTERNAL MEDICINE

## 2025-06-02 PROCEDURE — 99397 PER PM REEVAL EST PAT 65+ YR: CPT | Performed by: INTERNAL MEDICINE

## 2025-06-02 RX ORDER — BUDESONIDE 3 MG/1
CAPSULE, COATED PELLETS ORAL
COMMUNITY
Start: 2025-02-18

## 2025-06-02 RX ORDER — REGADENOSON 0.08 MG/ML
0.4 INJECTION, SOLUTION INTRAVENOUS
OUTPATIENT
Start: 2025-06-02

## 2025-06-02 RX ORDER — AMINOPHYLLINE 25 MG/ML
125 INJECTION, SOLUTION INTRAVENOUS ONCE AS NEEDED
OUTPATIENT
Start: 2025-06-02

## 2025-06-02 ASSESSMENT — ACTIVITIES OF DAILY LIVING (ADL)
BATHING: INDEPENDENT
GROCERY_SHOPPING: INDEPENDENT
MANAGING_FINANCES: INDEPENDENT
DRESSING: INDEPENDENT
TAKING_MEDICATION: INDEPENDENT
DOING_HOUSEWORK: INDEPENDENT

## 2025-06-02 ASSESSMENT — PATIENT HEALTH QUESTIONNAIRE - PHQ9
2. FEELING DOWN, DEPRESSED OR HOPELESS: NOT AT ALL
SUM OF ALL RESPONSES TO PHQ9 QUESTIONS 1 AND 2: 0
1. LITTLE INTEREST OR PLEASURE IN DOING THINGS: NOT AT ALL

## 2025-06-10 LAB
ALBUMIN SERPL-MCNC: 4.7 G/DL (ref 3.6–5.1)
ALP SERPL-CCNC: 43 U/L (ref 37–153)
ALT SERPL-CCNC: 15 U/L (ref 6–29)
ANION GAP SERPL CALCULATED.4IONS-SCNC: 12 MMOL/L (CALC) (ref 7–17)
AST SERPL-CCNC: 25 U/L (ref 10–35)
BILIRUB SERPL-MCNC: 0.6 MG/DL (ref 0.2–1.2)
BUN SERPL-MCNC: 23 MG/DL (ref 7–25)
CALCIUM SERPL-MCNC: 9.8 MG/DL (ref 8.6–10.4)
CHLORIDE SERPL-SCNC: 102 MMOL/L (ref 98–110)
CHOLEST SERPL-MCNC: 284 MG/DL
CHOLEST/HDLC SERPL: 2.2 (CALC)
CO2 SERPL-SCNC: 27 MMOL/L (ref 20–32)
CREAT SERPL-MCNC: 0.76 MG/DL (ref 0.6–0.95)
EGFRCR SERPLBLD CKD-EPI 2021: 77 ML/MIN/1.73M2
ERYTHROCYTE [DISTWIDTH] IN BLOOD BY AUTOMATED COUNT: 13 % (ref 11–15)
FERRITIN SERPL-MCNC: 122 NG/ML (ref 16–288)
GLUCOSE SERPL-MCNC: 93 MG/DL (ref 65–99)
HCT VFR BLD AUTO: 40.7 % (ref 35–45)
HDLC SERPL-MCNC: 132 MG/DL
HGB BLD-MCNC: 13.4 G/DL (ref 11.7–15.5)
IRON SATN MFR SERPL: 37 % (CALC) (ref 16–45)
IRON SERPL-MCNC: 123 MCG/DL (ref 45–160)
LDLC SERPL CALC-MCNC: 132 MG/DL (CALC)
MCH RBC QN AUTO: 31.9 PG (ref 27–33)
MCHC RBC AUTO-ENTMCNC: 32.9 G/DL (ref 32–36)
MCV RBC AUTO: 96.9 FL (ref 80–100)
NONHDLC SERPL-MCNC: 152 MG/DL (CALC)
PLATELET # BLD AUTO: 254 THOUSAND/UL (ref 140–400)
PMV BLD REES-ECKER: 9.4 FL (ref 7.5–12.5)
POTASSIUM SERPL-SCNC: 4 MMOL/L (ref 3.5–5.3)
PROT SERPL-MCNC: 7.1 G/DL (ref 6.1–8.1)
RBC # BLD AUTO: 4.2 MILLION/UL (ref 3.8–5.1)
SODIUM SERPL-SCNC: 141 MMOL/L (ref 135–146)
TIBC SERPL-MCNC: 334 MCG/DL (CALC) (ref 250–450)
TRIGL SERPL-MCNC: 101 MG/DL
WBC # BLD AUTO: 8.9 THOUSAND/UL (ref 3.8–10.8)

## 2025-06-19 ENCOUNTER — APPOINTMENT (OUTPATIENT)
Dept: CARDIOLOGY | Facility: CLINIC | Age: 85
End: 2025-06-19
Payer: MEDICARE

## 2025-06-19 ENCOUNTER — APPOINTMENT (OUTPATIENT)
Dept: RADIOLOGY | Facility: CLINIC | Age: 85
End: 2025-06-19
Payer: MEDICARE